# Patient Record
Sex: FEMALE | Race: WHITE | NOT HISPANIC OR LATINO | Employment: PART TIME | URBAN - NONMETROPOLITAN AREA
[De-identification: names, ages, dates, MRNs, and addresses within clinical notes are randomized per-mention and may not be internally consistent; named-entity substitution may affect disease eponyms.]

---

## 2017-04-11 ENCOUNTER — CLINICAL SUPPORT (OUTPATIENT)
Dept: AUDIOLOGY | Facility: CLINIC | Age: 62
End: 2017-04-11

## 2017-04-11 ENCOUNTER — OFFICE VISIT (OUTPATIENT)
Dept: OTOLARYNGOLOGY | Facility: CLINIC | Age: 62
End: 2017-04-11

## 2017-04-11 VITALS — HEIGHT: 63 IN | BODY MASS INDEX: 28.88 KG/M2 | TEMPERATURE: 98.7 F | WEIGHT: 163 LBS

## 2017-04-11 DIAGNOSIS — H72.01 CENTRAL PERFORATION OF TYMPANIC MEMBRANE OF RIGHT EAR: Primary | ICD-10-CM

## 2017-04-11 DIAGNOSIS — H60.63 CHRONIC NON-INFECTIVE OTITIS EXTERNA OF BOTH EARS, UNSPECIFIED TYPE: ICD-10-CM

## 2017-04-11 DIAGNOSIS — H90.0 CONDUCTIVE HEARING LOSS, BILATERAL: ICD-10-CM

## 2017-04-11 DIAGNOSIS — H90.2 CONDUCTIVE HEARING LOSS: Primary | ICD-10-CM

## 2017-04-11 PROCEDURE — 99214 OFFICE O/P EST MOD 30 MIN: CPT | Performed by: OTOLARYNGOLOGY

## 2017-04-11 RX ORDER — OFLOXACIN 3 MG/ML
SOLUTION/ DROPS OPHTHALMIC
Qty: 10 ML | Refills: 0 | Status: SHIPPED | OUTPATIENT
Start: 2017-04-11 | End: 2017-10-24

## 2017-04-11 NOTE — PROGRESS NOTES
Subjective   Tia Mcclure is a 61 y.o. female.       History of Present Illness   Patient is followed with a perforation of the right ear, chronic otitis externa, and borderline normal to mild hearing loss.  Has had multiple ear surgeries.  Says she has not noticed any otorrhea.  Had some otalgia for a couple of days recently but this resolved spontaneously.  Feels like her hearing is relatively stable.      The following portions of the patient's history were reviewed and updated as appropriate: allergies, current medications, past family history, past medical history, past social history, past surgical history and problem list.     reports that she has never smoked. She does not have any smokeless tobacco history on file. She reports that she does not drink alcohol or use illicit drugs.   Patient is not a tobacco user and has not been counseled for use of tobacco products      Review of Systems   Constitutional: Negative for fever.           Objective   Physical Exam    Right ear shows a large amount of crusted debris in the canal.  This is removed under the microscope with some resultant bleeding.  Tympanic membrane shows a dry perforation with peripheral tympanosclerosis but no squamous ingrowth.  Left ear canal shows a modest amount of squamous debris that cleaned under the microscope.  Tympanic membrane shows dense tympanosclerosis and loss of landmarks.    Audiogram is obtained and reviewed and shows a bilateral borderline to mild what appears to be conductive hearing loss essentially unchanged from 1 year ago.  Large volume tympanogram on the right flat tympanogram on the left 100% discrimination scores are present bilaterally    Assessment/Plan   Tia was seen today for follow-up.    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  Will send a prescription for ofloxacin ophthalmic solution 5 drops to the right ear twice a day ×5 days due to the irritation.  Otherwise return in 6 months for ear cleaning.  We'll retest hearing in a year.

## 2017-04-11 NOTE — PROGRESS NOTES
STANDARD AUDIOMETRIC EVALUATION      Name:  Tia Mcclure  :  1955  Age:  61 y.o.  Date of Evaluation:  2017      HISTORY    Reason for visit:  Tia Mcclure is seen today for a hearing evaluation at the request of Dr. Mayank Gavin.  Patient reports she has a hole in her right drum and she has not had any significant problems.  This is a 6 month recheck of her ears.        EVALUATION    See Audiogram    RESULTS        Otoscopy and Tympanometry 226 Hz :  Right Ear:  Otoscopy:  Visible ear drum          Tympanometry:  Unable to test due to no seal    Left Ear:   Otoscopy:  Clear ear canal        Tympanometry:  Reduced pressure and compliance consistent with outer/middle ear involvement    Test technique:  Standard Audiometry     Pure Tone Audiometry:   Patient responded to pure tones at 20-35 dB for 250-8000 Hz in right ear and at 15-45 dB for 250-8000 Hz in left ear.      Speech Audiometry:        Right Ear:  Speech Reception Threshold (SRT) was obtained at 30 dBHL                 Speech Discrimination scores were 100% in quiet when words were presented at 70 dBHL       Left Ear:  Speech Reception Threshold (SRT) was obtained at 25 dBHL                 Speech Discrimination scores were 100% in quiet when words were presented at 65 dBHL    Reliability:   good    IMPRESSIONS:  1.  Tympanometry results are consistent with Unable to test due to no seal in right ear, and Reduced pressure and compliance consistent with outer/middle ear involvement in left ear.  2.  Pure tone results are consistent with borderline normal to mild flat hearing loss for both ears.       RECOMMENDATIONS:  Patient is seeing the Ear Nose and Throat physician immediately following this examination.  It was a pleasure seeing Tia Mcclure in Audiology today.  We would be happy to do further testing or discuss these test as necessary.          This document has been electronically signed by Veronica Gamez MS CCC-A  on April 11, 2017 5:17 PM       Veronica Gamez MS CCC-A  Licensed Audiologist

## 2017-10-24 ENCOUNTER — OFFICE VISIT (OUTPATIENT)
Dept: OTOLARYNGOLOGY | Facility: CLINIC | Age: 62
End: 2017-10-24

## 2017-10-24 VITALS — BODY MASS INDEX: 28.88 KG/M2 | WEIGHT: 163 LBS | HEIGHT: 63 IN | TEMPERATURE: 99.2 F

## 2017-10-24 DIAGNOSIS — H72.91 PERFORATION OF RIGHT TYMPANIC MEMBRANE: Primary | ICD-10-CM

## 2017-10-24 DIAGNOSIS — H60.63 CHRONIC NON-INFECTIVE OTITIS EXTERNA OF BOTH EARS, UNSPECIFIED TYPE: ICD-10-CM

## 2017-10-24 PROCEDURE — 99213 OFFICE O/P EST LOW 20 MIN: CPT | Performed by: OTOLARYNGOLOGY

## 2017-10-24 PROCEDURE — 92504 EAR MICROSCOPY EXAMINATION: CPT | Performed by: OTOLARYNGOLOGY

## 2017-10-24 RX ORDER — ATORVASTATIN CALCIUM 80 MG/1
TABLET, FILM COATED ORAL
COMMUNITY
Start: 2017-10-23

## 2017-10-24 NOTE — PROGRESS NOTES
Subjective   Tia Mcclure is a 62 y.o. female.       History of Present Illness   Patient is followed with a right-sided tympanic membrane perforation as well as bilateral chronic otitis externa.  Has had multiple ear surgeries.  Previously been noted to have borderline normal to mild hearing loss.  Is not having any otorrhea, no otalgia.  Feels like her hearing is about the same.      The following portions of the patient's history were reviewed and updated as appropriate: allergies, current medications, past family history, past medical history, past social history, past surgical history and problem list.     reports that she has never smoked. She has never used smokeless tobacco. She reports that she does not drink alcohol or use illicit drugs.   Patient is not a tobacco user and has not been counseled for use of tobacco products      Review of Systems   Constitutional: Negative for fever.           Objective   Physical Exam  General: Well-developed well-nourished female in no acute distress.  Alert and oriented ×3. Head: Normocephalic. Face: Symmetrical strength and appearance. PERRL. EOMI. Voice:Strong. Speech:Fluent  Ears: External ears no deformity, both ear canals show a large amount of squamous debris that cleaned under the microscope using instrumentation.  Right tympanic membrane shows a dry central perforation with surrounding tympanosclerosis but no evidence of squamous ingrowth or granulation tissue.  Left tympanic membrane shows tympanosclerosis and loss of landmarks.      Assessment/Plan   Tia was seen today for follow-up.    Diagnoses and all orders for this visit:    Perforation of right tympanic membrane    Chronic non-infective otitis externa of both ears, unspecified type      And: Ears cleaned as described above.  Continue water precautions.  Return in 6 months.  Repeat audiogram at that time.